# Patient Record
Sex: MALE | Race: WHITE | URBAN - METROPOLITAN AREA
[De-identification: names, ages, dates, MRNs, and addresses within clinical notes are randomized per-mention and may not be internally consistent; named-entity substitution may affect disease eponyms.]

---

## 2017-07-03 ENCOUNTER — TELEPHONE (OUTPATIENT)
Dept: FAMILY MEDICINE | Facility: CLINIC | Age: 57
End: 2017-07-03

## 2017-07-03 DIAGNOSIS — K64.4 EXTERNAL HEMORRHOIDS: Primary | ICD-10-CM

## 2017-07-03 RX ORDER — TRIAMCINOLONE ACETONIDE 1 MG/G
CREAM TOPICAL 3 TIMES DAILY PRN
Qty: 80 G | Refills: 0 | Status: SHIPPED | OUTPATIENT
Start: 2017-07-03

## 2017-07-03 NOTE — TELEPHONE ENCOUNTER
Pt needs to speak to Dr. Kaminski regarding a personal health matter.        Thank you,   [place] Scheduling  [phone]

## 2017-07-03 NOTE — TELEPHONE ENCOUNTER
Called patient. Recurrence of hemorrhoids. Actually settled down a lot today but it sounds like this is a recurrent issue every week or 2. Referral placed to [facility]

## 2017-07-03 NOTE — TELEPHONE ENCOUNTER
This writer attempted to contact Param on 07/03/17      Reason for call returned call and left message to return call.      When patient calls back, please contact clinic RN team. If no one available, document that pt called and route to care team.         Marisol Pride RN

## 2017-07-03 NOTE — TELEPHONE ENCOUNTER
Patient is calling back stating that he only wants to talk to Dr Kaminski about a personal issue that pt has been seen for.

## 2017-10-02 ENCOUNTER — OFFICE VISIT (OUTPATIENT)
Dept: FAMILY MEDICINE | Facility: CLINIC | Age: 57
End: 2017-10-02
Payer: COMMERCIAL

## 2017-10-02 VITALS
HEIGHT: 69 IN | HEART RATE: 85 BPM | BODY MASS INDEX: 29.15 KG/M2 | WEIGHT: 196.8 LBS | SYSTOLIC BLOOD PRESSURE: 130 MMHG | DIASTOLIC BLOOD PRESSURE: 88 MMHG | TEMPERATURE: 98 F | OXYGEN SATURATION: 95 %

## 2017-10-02 DIAGNOSIS — H61.22 IMPACTED CERUMEN OF LEFT EAR: Primary | ICD-10-CM

## 2017-10-02 PROCEDURE — 99213 OFFICE O/P EST LOW 20 MIN: CPT | Mod: 25 | Performed by: FAMILY MEDICINE

## 2017-10-02 PROCEDURE — 69210 REMOVE IMPACTED EAR WAX UNI: CPT | Performed by: FAMILY MEDICINE

## 2017-10-02 ASSESSMENT — PAIN SCALES - GENERAL: PAINLEVEL: MILD PAIN (3)

## 2017-10-02 NOTE — NURSING NOTE
"Chief Complaint   Patient presents with     Ear Problem     left ear       Initial BP (!) 132/92 (BP Location: Right arm, Patient Position: Chair, Cuff Size: Adult Regular)  Pulse 85  Temp 98  F (36.7  C) (Oral)  Ht 1.741 m (5' 8.54\")  Wt 89.3 kg (196 lb 12.8 oz)  SpO2 95%  BMI 29.45 kg/m2 Estimated body mass index is 29.45 kg/(m^2) as calculated from the following:    Height as of this encounter: 1.741 m (5' 8.54\").    Weight as of this encounter: 89.3 kg (196 lb 12.8 oz).  Medication Reconciliation: complete     SUDEEP Leija MA      "

## 2017-10-02 NOTE — PROGRESS NOTES
"  SUBJECTIVE:   Param Reilly is a 56 year old male who presents to clinic today for the following health issues:      Acute Illness   Acute illness concerns: LEFT EAR  Onset: 8-10 days    Fever: no    Chills/Sweats: YES    Headache (location?): no    Sinus Pressure:YES left maxillary     Conjunctivitis:  no    Ear Pain: YES: left    Rhinorrhea: YES    Congestion: YES    Sore Throat: no     Cough: no    Wheeze: no    Decreased Appetite: no    Nausea: no    Vomiting: no    Diarrhea:  no    Dysuria/Freq.: no    Fatigue/Achiness: no    Sick/Strep Exposure: no     Therapies Tried and outcome: none  Night sweats.            Problem list and histories reviewed & adjusted, as indicated.  Additional history: as documented    BP Readings from Last 3 Encounters:   10/02/17 130/88   10/18/16 126/82   08/24/16 134/88    Wt Readings from Last 3 Encounters:   10/02/17 89.3 kg (196 lb 12.8 oz)   10/18/16 89.8 kg (198 lb)   08/24/16 90.5 kg (199 lb 9.6 oz)                      Reviewed and updated as needed this visit by clinical staffTobacco  Allergies  Med Hx  Surg Hx  Fam Hx  Soc Hx      Reviewed and updated as needed this visit by Provider  Tobacco  Allergies  Meds  Med Hx  Surg Hx  Fam Hx  Soc Hx        ROS:  Constitutional, HEENT, cardiovascular, pulmonary, gi and gu systems are negative, except as otherwise noted.      OBJECTIVE:   /88  Pulse 85  Temp 98  F (36.7  C) (Oral)  Ht 1.741 m (5' 8.54\")  Wt 89.3 kg (196 lb 12.8 oz)  SpO2 95%  BMI 29.45 kg/m2  Body mass index is 29.45 kg/(m^2).  GENERAL: healthy, alert and no distress  HENT: normal cephalic/atraumatic, right ear: normal: no effusions, no erythema, normal landmarks, left ear: Cerumenosis is noted.  Wax is removed by syringing and manual debridement.  Large amount removed.  Some residual remains, debrox recommended.  Instructions for home care to prevent wax buildup are given., nose and mouth without ulcers or lesions, oropharynx clear " and oral mucous membranes moist  NECK: no adenopathy, no asymmetry, masses, or scars and thyroid normal to palpation  RESP: lungs clear to auscultation - no rales, rhonchi or wheezes  CV: regular rate and rhythm, normal S1 S2, no S3 or S4, no murmur, click or rub, no peripheral edema and peripheral pulses strong  MS: no gross musculoskeletal defects noted, no edema          ASSESSMENT/PLAN:           1. Impacted cerumen of left ear  No sign infection on exam.  Residual cerumen present.    - REMOVE IMPACTED CERUMEN  - carbamide peroxide (DEBROX) 6.5 % otic solution; Place 5-10 drops Into the left ear 2 times daily  Dispense: 30 mL; Refill: 0    Patient Instructions   Debrox drops twice a day as directed.      Once current wax is out, you can prevent future plugging with the following:    For the ear wax - use cotton ball dipped in mineral oil and place in the external canal for 10 to 20 minutes once per week        Sara Steve MD  [facility]

## 2017-10-02 NOTE — PATIENT INSTRUCTIONS
Debrox drops twice a day as directed.      Once current wax is out, you can prevent future plugging with the following:    For the ear wax - use cotton ball dipped in mineral oil and place in the external canal for 10 to 20 minutes once per week

## 2017-10-02 NOTE — MR AVS SNAPSHOT
After Visit Summary   10/2/2017    Param Reilly    MRN: [id number]           Patient Information     Date Of Birth          1960        Visit Information        Provider Department      10/2/2017 5:20 PM Sara Steve MD [facility]        Today's Diagnoses     Impacted cerumen of left ear    -  1      Care Instructions    Debrox drops twice a day as directed.      Once current wax is out, you can prevent future plugging with the following:    For the ear wax - use cotton ball dipped in mineral oil and place in the external canal for 10 to 20 minutes once per week            Follow-ups after your visit        Who to contact     If you have questions or need follow up information about today's clinic visit or your schedule please contact [facility] directly at [phone].  Normal or non-critical lab and imaging results will be communicated to you by MyChart, letter or phone within 4 business days after the clinic has received the results. If you do not hear from us within 7 days, please contact the clinic through MyChart or phone. If you have a critical or abnormal lab result, we will notify you by phone as soon as possible.  Submit refill requests through [employer] or call your pharmacy and they will forward the refill request to us. Please allow 3 business days for your refill to be completed.          Additional Information About Your Visit        MyChart Information     [employer] gives you secure access to your electronic health record. If you see a primary care provider, you can also send messages to your care team and make appointments. If you have questions, please call your primary care clinic.  If you do not have a primary care provider, please call [phone] and they will assist you.        Care EveryWhere ID     This is your Care EveryWhere ID. This could be used by other organizations to access your [facility] medical records  [id number]       "  Your Vitals Were     Pulse Temperature Height Pulse Oximetry BMI (Body Mass Index)       85 98  F (36.7  C) (Oral) 1.741 m (5' 8.54\") 95% 29.45 kg/m2        Blood Pressure from Last 3 Encounters:   10/02/17 130/88   10/18/16 126/82   08/24/16 134/88    Weight from Last 3 Encounters:   10/02/17 89.3 kg (196 lb 12.8 oz)   10/18/16 89.8 kg (198 lb)   08/24/16 90.5 kg (199 lb 9.6 oz)              We Performed the Following     REMOVE IMPACTED CERUMEN          Today's Medication Changes          These changes are accurate as of: 10/2/17  6:44 PM.  If you have any questions, ask your nurse or doctor.               Start taking these medicines.        Dose/Directions    carbamide peroxide 6.5 % otic solution   Commonly known as:  DEBROX   Used for:  Impacted cerumen of left ear   Started by:  Sara Steve MD        Dose:  5-10 drop   Place 5-10 drops Into the left ear 2 times daily   Quantity:  30 mL   Refills:  0            Where to get your medicines      These medications were sent to [facility] [id number] [place] [address], [address], [facility] [address]     Phone:  [phone]     carbamide peroxide 6.5 % otic solution                Primary Care Provider Office Phone # Fax #    Noa Nayan Kaminski -[phone] [address] [facility] [address]        Equal Access to Services     ABIGAIL MOLINA AH: Hadii tatyana lara hadasho Soomaali, waaxda luqadaha, qaybta kaalmada adeegyada, waxay iditana delarosa. So [facility] [phone].    ATENCIÓN: Si habla español, tiene a chandler disposición servicios gratuitos de asistencia lingüística. Llame al [phone].    We comply with applicable federal civil rights laws and [place] laws. We do not discriminate on the basis of race, color, national origin, age, disability, sex, sexual orientation, or gender identity.            Thank you!     Thank you for choosing [facility]  for your care. Our goal is always to provide you with excellent care. Hearing back from our patients is one way we can continue to improve our services. Please take a few minutes to complete the written survey that you may receive in the mail after your visit with us. Thank you!             Your Updated Medication List - Protect others around you: Learn how to safely use, store and throw away your medicines at www.disposemymeds.org.          This list is accurate as of: 10/2/17  6:44 PM.  Always use your most recent med list.                   Brand Name Dispense Instructions for use Diagnosis    carbamide peroxide 6.5 % otic solution    DEBROX    30 mL    Place 5-10 drops Into the left ear 2 times daily    Impacted cerumen of left ear       triamcinolone 0.1 % cream    KENALOG    80 g    Apply topically 3 times daily as needed for irritation    External hemorrhoids

## 2018-09-24 ENCOUNTER — OFFICE VISIT (OUTPATIENT)
Dept: ORTHOPEDICS | Facility: CLINIC | Age: 58
End: 2018-09-24
Payer: COMMERCIAL

## 2018-09-24 ENCOUNTER — RADIANT APPOINTMENT (OUTPATIENT)
Dept: GENERAL RADIOLOGY | Facility: CLINIC | Age: 58
End: 2018-09-24
Attending: FAMILY MEDICINE
Payer: COMMERCIAL

## 2018-09-24 VITALS
BODY MASS INDEX: 29.18 KG/M2 | SYSTOLIC BLOOD PRESSURE: 149 MMHG | OXYGEN SATURATION: 95 % | HEIGHT: 69 IN | DIASTOLIC BLOOD PRESSURE: 89 MMHG | HEART RATE: 90 BPM | WEIGHT: 197 LBS

## 2018-09-24 DIAGNOSIS — M25.561 ACUTE PAIN OF RIGHT KNEE: Primary | ICD-10-CM

## 2018-09-24 DIAGNOSIS — M25.561 RIGHT KNEE PAIN: ICD-10-CM

## 2018-09-24 PROCEDURE — 20610 DRAIN/INJ JOINT/BURSA W/O US: CPT | Mod: RT | Performed by: FAMILY MEDICINE

## 2018-09-24 PROCEDURE — 99207 ZZC NO CHARGE LOS: CPT | Performed by: FAMILY MEDICINE

## 2018-09-24 PROCEDURE — 73562 X-RAY EXAM OF KNEE 3: CPT | Mod: RT | Performed by: RADIOLOGY

## 2018-09-24 RX ORDER — TRIAMCINOLONE ACETONIDE 40 MG/ML
40 INJECTION, SUSPENSION INTRA-ARTICULAR; INTRAMUSCULAR ONCE
Qty: 1 ML | Refills: 0 | OUTPATIENT
Start: 2018-09-24 | End: 2018-09-24

## 2018-09-24 ASSESSMENT — PAIN SCALES - GENERAL: PAINLEVEL: MODERATE PAIN (5)

## 2018-09-24 NOTE — NURSING NOTE
"Param Reilly's chief complaint for this visit includes:  Chief Complaint   Patient presents with     Consult     right knee pain [id number]     PCP: Noa Kaminski    Referring Provider:  No referring provider defined for this encounter.    /89  Pulse 90  Ht 1.745 m (5' 8.7\")  Wt 89.4 kg (197 lb)  SpO2 95%  BMI 29.35 kg/m2  Moderate Pain (5)     Do you need any medication refills at today's visit? No        "

## 2018-09-24 NOTE — PATIENT INSTRUCTIONS
Thanks for coming today.  Ortho/Sports Medicine Clinic  [address] [place] [address]    To schedule future appointments in Ortho Clinic, you may call [phone].    To schedule ordered imaging by your provider:   Call Central Imaging [phone]    To schedule an injection ordered by your provider:  Call Central Imaging Injection scheduling line: [phone]  [employer]hart available online at:  [email or web address]    Please call if any further questions or concerns ([phone]).  Clinic hours 8 am to 5 pm.    Return to clinic (call) if symptoms worsen or fail to improve.

## 2018-09-24 NOTE — PROGRESS NOTES
CHIEF COMPLAINT:  No chief complaint on file.       HISTORY OF PRESENT ILLNESS  Mr. Reilly is a pleasant 57 year old year old male who presents to clinic today with right knee pain.  Kevin is seen at the request of Dr. Kaminski.    Kevin first noticed pain and swelling in his right knee about 3-1/2-4 weeks ago.  No event that he can recall that caused his pain.  His knee has been swelling over this time as well.  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  He points mostly to the lateral and medial compartments.  His knee has not given out on him, he has not noticed any redness but he has noticed some heat.    Additional history: as documented    MEDICAL HISTORY  Patient Active Problem List   Diagnosis     Other acquired deformity of other parts of limb     CARDIOVASCULAR SCREENING; LDL GOAL LESS THAN 130     Tobacco abuse     Non morbid obesity due to excess calories       Current Outpatient Prescriptions   Medication Sig Dispense Refill     carbamide peroxide (DEBROX) 6.5 % otic solution Place 5-10 drops Into the left ear 2 times daily 30 mL 0     triamcinolone (KENALOG) 0.1 % cream Apply topically 3 times daily as needed for irritation 80 g 0       Allergies   Allergen Reactions     Bactrim [Sulfamethoxazole W/Trimethoprim] Itching and Rash       Family History   Problem Relation Age of Onset     Cancer Father        Additional medical/Social/Surgical histories reviewed in [facility] and updated as appropriate.     REVIEW OF SYSTEMS (9/24/2018)  CONSTITUTIONAL: Denies fever and weight loss  EYES: Denies acute vision changes  ENT: Denies hearing changes or difficulty swallowing  CARDIAC: Denies chest pain or edema  RESPIRATORY: Denies dyspnea, cough or wheeze  GASTROINTESTINAL: Denies abdominal pain, nausea, vomiting  MUSCULOSKELETAL: See HPI  SKIN: Denies any recent rash or lesion  NEUROLOGICAL: Denies numbness or focal weakness  PSYCHIATRIC: No history of psychiatric symptoms or  "problems  ENDOCRINE: Denies current diagnosis of diabetes  HEMATOLOGY: Denies episodes of easy bleeding      PHYSICAL EXAM  Vitals:    09/24/18 0714   BP: 149/89   Pulse: 90   SpO2: 95%   Weight: 89.4 kg (197 lb)   Height: 1.745 m (5' 8.7\")     General  - normal appearance, in no obvious distress  CV  - normal popliteal pulse  Pulm  - normal respiratory pattern, non-labored  Musculoskeletal - right knee  - stance: mildly antalgic gait  - inspection: generalized swelling, 1+effusion  - palpation: medial and lateral joint line tenderness, patellofemoral tenderness medially and laterally  - ROM: 100 degrees flexion, 0 degrees extension, painful active ROM  - strength: 5/5 in flexion, 5/5 in extension  Neuro  - no sensory or motor deficit, grossly normal coordination, normal muscle tone  Skin  - no ecchymosis, erythema, warmth, or induration, no obvious rash  Psych  - interactive, appropriate, normal mood and affect             ASSESSMENT & PLAN  Mr. Reilly is a 57 year old year old male who presents to clinic today with right knee pain.    I ordered and reviewed an x-ray of his knee which does show lateral and medial arthritic change.    Pain is likely to be secondary to osteoarthritis.  Kevin's we discussed pathogenesis and comments treatment strategies in detail.    I did inject his knee with corticosteroid today (see procedure note).  He tolerated this well.    If he continues to have pain over the next 2-3 weeks we should follow-up, if this is the case I would likely order an MRI.    Thank you for allowing me to participate in Kevin's care.    PROCEDURE    Knee Injection - Intraarticular  The patient was informed of the risks and the benefits of the procedure and a written consent was signed.  The patient s left knee was prepped with chlorhexidine in sterile fashion.   40 mg of triamcinolone suspension was drawn up into a 5 mL syringe with 2 mL of 1% lidocaine and 2 mL of 0.5% marcaine.  Injection was performed " using substerile technique.  A 1.5-inch 25-gauge needle was used to enter the lateral aspect of the left knee.  Injection performed successfully without difficulty.  There were no complications. The patient tolerated the procedure well. There was negligible bleeding.   The patient was instructed to ice the knee upon leaving clinic and refrain from overuse over the next 3 days.   The patient was instructed to call or go to the emergency room with any unusual pain, swelling, redness, or if otherwise concerned.  A follow up appointment will be scheduled to evaluate response to the injection, and to assess range of motion and pain.  Kenalog: NDC [id number]        Ej Souza DO, [facility]  Primary Care Sports Medicine

## 2018-09-24 NOTE — MR AVS SNAPSHOT
After Visit Summary   2018    Param Reilly    MRN: [id number]           Patient Information     Date Of Birth          1960        Visit Information        Provider Department      2018 7:00 AM Ej Souza, DO [facility]        Today's Diagnoses     Right knee pain    -  1      Care Instructions    Thanks for coming today.  Ortho/Sports Medicine Clinic  [address] [place] [address]    To schedule future appointments in Ortho Clinic, you may call [phone].    To schedule ordered imaging by your provider:   Call Central Imaging [phone]    To schedule an injection ordered by your provider:  Call Central Imaging Injection scheduling line: [phone]  Ready available online at:  [employer]/Sara Campbell    Please call if any further questions or concerns ([phone]).  Clinic hours 8 am to 5 pm.    Return to clinic (call) if symptoms worsen or fail to improve.            Follow-ups after your visit        Who to contact     If you have questions or need follow up information about today's clinic visit or your schedule please contact [facility] directly at [phone].  Normal or non-critical lab and imaging results will be communicated to you by [employer], letter or phone within 4 business days after the clinic has received the results. If you do not hear from us within 7 days, please contact the clinic through [employer] or phone. If you have a critical or abnormal lab result, we will notify you by phone as soon as possible.  Submit refill requests through [employer] or call your pharmacy and they will forward the refill request to us. Please allow 3 business days for your refill to be completed.          Additional Information About Your Visit        MyChart Information     Ready gives you secure access to your electronic health record. If you see a primary care provider, you can also send messages to your  "care team and make appointments. If you have questions, please call your primary care clinic.  If you do not have a primary care provider, please call [phone] and they will assist you.      [employer] is an electronic gateway that provides easy, online access to your medical records. With [employer], you can request a clinic appointment, read your test results, renew a prescription or communicate with your care team.     To access your existing account, please contact your [facility] or call [phone] for assistance.        Care EveryWhere ID     This is your Care EveryWhere ID. This could be used by other organizations to access your [place] medical records  [id number]        Your Vitals Were     Pulse Height Pulse Oximetry BMI (Body Mass Index)          90 1.745 m (5' 8.7\") 95% 29.35 kg/m2         Blood Pressure from Last 3 Encounters:   09/24/18 149/89   10/02/17 130/88   10/18/16 126/82    Weight from Last 3 Encounters:   09/24/18 89.4 kg (197 lb)   10/02/17 89.3 kg (196 lb 12.8 oz)   10/18/16 89.8 kg (198 lb)               Primary Care Provider Office Phone # Fax #    Noa Nayan Kaminski [phone] [address] [facility] [address]        Equal Access to Services     MALLORY MOLINA : Hadii aad ku hadasho Soomaali, waaxda luqadaha, qaybta kaalmada adeegyada, max delarosa. So [facility] [phone].    ATENCIÓN: Si habla español, tiene a chandler disposición servicios gratuitos de asistencia lingüística. Llame al [phone].    We comply with applicable federal civil rights laws and [place] laws. We do not discriminate on the basis of race, color, national origin, age, disability, sex, sexual orientation, or gender identity.            Thank you!     Thank you for choosing [facility]  for your care. Our goal is always to provide you with excellent care. Hearing back from our patients is one way we can " continue to improve our services. Please take a few minutes to complete the written survey that you may receive in the mail after your visit with us. Thank you!             Your Updated Medication List - Protect others around you: Learn how to safely use, store and throw away your medicines at www.disposemymeds.org.          This list is accurate as of 9/24/18  7:47 AM.  Always use your most recent med list.                   Brand Name Dispense Instructions for use Diagnosis    carbamide peroxide 6.5 % otic solution    DEBROX    30 mL    Place 5-10 drops Into the left ear 2 times daily    Impacted cerumen of left ear       triamcinolone 0.1 % cream    KENALOG    80 g    Apply topically 3 times daily as needed for irritation    External hemorrhoids

## 2018-09-24 NOTE — LETTER
September 24, 2018      Param Reilly  [address] [place] [address]              Dear  or Kevin King was seen in our office today for right knee pain.  Please excuse him from work for today's appointment.    Please call with questions or concerns.      Sincerely,              Ej Souza, DO [facility]

## 2018-09-24 NOTE — LETTER
9/24/2018         RE: Param Reilly  [address] [place] [address]        Dear Colleague,    Thank you for referring your patient, Param Reilly, to the [facility]. Please see a copy of my visit note below.    CHIEF COMPLAINT:  No chief complaint on file.       HISTORY OF PRESENT ILLNESS  Mr. Reilly is a pleasant 57 year old year old male who presents to clinic today with right knee pain.  Kevin is seen at the request of Dr. Kaminski.    Kevin first noticed pain and swelling in his right knee about 3-1/2-4 weeks ago.  No event that he can recall that caused his pain.  His knee has been swelling over this time as well.  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  He points mostly to the lateral and medial compartments.  His knee has not given out on him, he has not noticed any redness but he has noticed some heat.    Additional history: as documented    MEDICAL HISTORY  Patient Active Problem List   Diagnosis     Other acquired deformity of other parts of limb     CARDIOVASCULAR SCREENING; LDL GOAL LESS THAN 130     Tobacco abuse     Non morbid obesity due to excess calories       Current Outpatient Prescriptions   Medication Sig Dispense Refill     carbamide peroxide (DEBROX) 6.5 % otic solution Place 5-10 drops Into the left ear 2 times daily 30 mL 0     triamcinolone (KENALOG) 0.1 % cream Apply topically 3 times daily as needed for irritation 80 g 0       Allergies   Allergen Reactions     Bactrim [Sulfamethoxazole W/Trimethoprim] Itching and Rash       Family History   Problem Relation Age of Onset     Cancer Father        Additional medical/Social/Surgical histories reviewed in [facility] and updated as appropriate.     REVIEW OF SYSTEMS (9/24/2018)  CONSTITUTIONAL: Denies fever and weight loss  EYES: Denies acute vision changes  ENT: Denies hearing changes or difficulty swallowing  CARDIAC: Denies chest pain or edema  RESPIRATORY:  "Denies dyspnea, cough or wheeze  GASTROINTESTINAL: Denies abdominal pain, nausea, vomiting  MUSCULOSKELETAL: See HPI  SKIN: Denies any recent rash or lesion  NEUROLOGICAL: Denies numbness or focal weakness  PSYCHIATRIC: No history of psychiatric symptoms or problems  ENDOCRINE: Denies current diagnosis of diabetes  HEMATOLOGY: Denies episodes of easy bleeding      PHYSICAL EXAM  Vitals:    09/24/18 0714   BP: 149/89   Pulse: 90   SpO2: 95%   Weight: 89.4 kg (197 lb)   Height: 1.745 m (5' 8.7\")     General  - normal appearance, in no obvious distress  CV  - normal popliteal pulse  Pulm  - normal respiratory pattern, non-labored  Musculoskeletal - right knee  - stance: mildly antalgic gait  - inspection: generalized swelling, 1+effusion  - palpation: medial and lateral joint line tenderness, patellofemoral tenderness medially and laterally  - ROM: 100 degrees flexion, 0 degrees extension, painful active ROM  - strength: 5/5 in flexion, 5/5 in extension  Neuro  - no sensory or motor deficit, grossly normal coordination, normal muscle tone  Skin  - no ecchymosis, erythema, warmth, or induration, no obvious rash  Psych  - interactive, appropriate, normal mood and affect             ASSESSMENT & PLAN  Mr. Reilly is a 57 year old year old male who presents to clinic today with right knee pain.    I ordered and reviewed an x-ray of his knee which does show lateral and medial arthritic change.    Pain is likely to be secondary to osteoarthritis.  Kevin's we discussed pathogenesis and comments treatment strategies in detail.    I did inject his knee with corticosteroid today (see procedure note).  He tolerated this well.    If he continues to have pain over the next 2-3 weeks we should follow-up, if this is the case I would likely order an MRI.    Thank you for allowing me to participate in Kevin's care.    PROCEDURE    Knee Injection - Intraarticular  The patient was informed of the risks and the benefits of the procedure " and a written consent was signed.  The patient s left knee was prepped with chlorhexidine in sterile fashion.   40 mg of triamcinolone suspension was drawn up into a 5 mL syringe with 2 mL of 1% lidocaine and 2 mL of 0.5% marcaine.  Injection was performed using substerile technique.  A 1.5-inch 25-gauge needle was used to enter the lateral aspect of the left knee.  Injection performed successfully without difficulty.  There were no complications. The patient tolerated the procedure well. There was negligible bleeding.   The patient was instructed to ice the knee upon leaving clinic and refrain from overuse over the next 3 days.   The patient was instructed to call or go to the emergency room with any unusual pain, swelling, redness, or if otherwise concerned.  A follow up appointment will be scheduled to evaluate response to the injection, and to assess range of motion and pain.  Kenalog: NDC 7976-3882-34        Ej Souza DO, [facility]  Primary Care Sports Medicine           Again, thank you for allowing me to participate in the care of your patient.        Sincerely,        Ej Souza DO

## 2018-12-12 ENCOUNTER — OFFICE VISIT (OUTPATIENT)
Dept: ORTHOPEDICS | Facility: CLINIC | Age: 58
End: 2018-12-12
Payer: COMMERCIAL

## 2018-12-12 ENCOUNTER — TELEPHONE (OUTPATIENT)
Dept: ORTHOPEDICS | Facility: CLINIC | Age: 58
End: 2018-12-12

## 2018-12-12 VITALS
DIASTOLIC BLOOD PRESSURE: 92 MMHG | WEIGHT: 192.6 LBS | HEART RATE: 72 BPM | SYSTOLIC BLOOD PRESSURE: 130 MMHG | OXYGEN SATURATION: 97 % | BODY MASS INDEX: 28.69 KG/M2

## 2018-12-12 DIAGNOSIS — M25.461 EFFUSION, RIGHT KNEE: ICD-10-CM

## 2018-12-12 DIAGNOSIS — M17.11 PRIMARY OSTEOARTHRITIS OF RIGHT KNEE: Primary | ICD-10-CM

## 2018-12-12 PROCEDURE — 99207 ZZC DROP WITH A PROCEDURE: CPT | Mod: 25 | Performed by: FAMILY MEDICINE

## 2018-12-12 PROCEDURE — 20611 DRAIN/INJ JOINT/BURSA W/US: CPT | Mod: RT | Performed by: FAMILY MEDICINE

## 2018-12-12 RX ORDER — IBUPROFEN 200 MG
600 TABLET ORAL EVERY 4 HOURS PRN
COMMUNITY

## 2018-12-12 ASSESSMENT — PAIN SCALES - GENERAL: PAINLEVEL: SEVERE PAIN (6)

## 2018-12-12 NOTE — NURSING NOTE
Param Reilly's chief complaint for this visit includes:  Chief Complaint   Patient presents with     Knee right     pain to right knee Cortisone injection helped for 1 to 2 weeks     PCP: Noa Kaminski    Referring Provider:  Ej Souza DO  [address] [place] [address]    There were no vitals taken for this visit.  Severe Pain (6)     Do you need any medication refills at today's visit? Lula Miller LPN

## 2018-12-12 NOTE — PATIENT INSTRUCTIONS
Thanks for coming today.  Ortho/Sports Medicine Clinic  [address] [place] [address]    To schedule future appointments in Ortho Clinic, you may call [phone].    To schedule ordered imaging by your provider:   Call Central Imaging [phone]    To schedule an injection ordered by your provider:  Call Central Imaging Injection scheduling line: [phone]  Sophiris Bio available online at:  [email or web address]    Please call if any further questions or concerns ([phone]).  Clinic hours 8 am to 5 pm.    Return to clinic (call) if symptoms worsen or fail to improve.  Patient Education     Hylan Solution for injection  What is this medicine?  HYLAN G-F 20 (HI layton G F 20) is used to treat osteoarthritis of the knee. It lubricates and cushions the joint, reducing pain in the knee.  This medicine may be used for other purposes; ask your health care provider or pharmacist if you have questions.  What should I tell my health care provider before I take this medicine?  They need to know if you have any of these conditions:    severe knee inflammation    skin conditions or sensitivity    skin or joint infection    venous stasis    an unusual or allergic reaction to hylan G-F 20, hyaluronan (sodium hyaluronate), eggs, other medicines, foods, dyes, or preservatives    pregnant or trying to get pregnant    breast-feeding  How should I use this medicine?  This medicine is for injection into the knee joint. It is given by a health care professional in a hospital or clinic setting.  Talk to your pediatrician regarding the use of this medicine in children. This medicine is not approved for use in children.  Overdosage: If you think you've taken too much of this medicine contact a poison control center or emergency room at once.  NOTE: This medicine is only for you. Do not share this medicine with others.  What if I miss a dose?  Keep appointments for follow-up doses as directed. For Synvisc,  you will need weekly injections for 3 doses. It is important not to miss your dose. If you will receive Synvisc-One, then only 1 injection will be needed. Call your doctor or health care professional if you are unable to keep an appointment.  What may interact with this medicine?  Do not take this medicine with any of the following medications:    other injections for the joint like steroids or anesthetics    certain skin disinfectants like benzalkonium chloride  This list may not describe all possible interactions. Give your health care provider a list of all the medicines, herbs, non-prescription drugs, or dietary supplements you use. Also tell them if you smoke, drink alcohol, or use illegal drugs. Some items may interact with your medicine.  What should I watch for while using this medicine?  Tell your doctor or healthcare professional if your symptoms do not start to get better or if they get worse. Your condition will be monitored carefully while you are receiving this medicine. Most persons get pain relief for up to 6 months after treatment.  Avoid strenuous activities (high-impact sports, jogging) or major weight-bearing activities for 48 hours after the injection.  What side effects may I notice from receiving this medicine?  Side effects that you should report to your doctor or health care professional as soon as possible:    allergic reactions like skin rash, itching or hives, swelling of the face, lips, or tongue    difficulty breathing    fever or chills    severe joint pain or swelling    unusual bleeding or bruising  Side effects that usually do not require medical attention (Report these to your doctor or health care professional if they continue or are bothersome.):    dizziness    flushing    general ill feeling or flu-like symptoms    headache    minor joint pain or swelling    muscle pain or cramps    pain, redness, irritation or bruising at site of injection  This list may not describe all  possible side effects. Call your doctor for medical advice about side effects. You may report side effects to FDA at 9-749-NNN-7646.  Where should I keep my medicine?  This drug is given in a hospital or clinic and will not be stored at home.  NOTE: This sheet is a summary. It may not cover all possible information. If you have questions about this medicine, talk to your doctor, pharmacist, or health care provider.  NOTE:This sheet is a summary. It may not cover all possible information. If you have questions about this medicine, talk to your doctor, pharmacist, or health care provider. Copyright  2016 Gold Standard           Patient Education

## 2018-12-12 NOTE — LETTER
12/12/2018         RE: Param Reilly  [address] [place] [address]        Dear Colleague,    Thank you for referring your patient, Param Reilly, to the [facility]. Please see a copy of my visit note below.    HISTORY OF PRESENT ILLNESS  Mr. Reilly is a pleasant 58 year old year old male following up with right knee osteoarthritis.  I last saw Kevin in September.  Visit I injected his knee with corticosteroid.  This helped his symptoms for a couple of weeks, unfortunately his knee swelling and pain returned.  He is interested in a repeat injection today.  Additional history: as documented      REVIEW OF SYSTEMS (12/12/2018)  10 point ROS of systems including Constitutional, Eyes, Respiratory, Cardiovascular, Gastroenterology, Genitourinary, Integumentary, Musculoskeletal, Psychiatric were all negative except for pertinent positives noted in my HPI.     PHYSICAL EXAM  Vitals:    12/12/18 1422   BP: (!) 130/92   BP Location: Left arm   Patient Position: Sitting   Cuff Size: Adult Regular   Pulse: 72   SpO2: 97%   Weight: 87.4 kg (192 lb 9.6 oz)     General  - normal appearance, in no obvious distress  CV  - normal popliteal pulse  Pulm  - normal respiratory pattern, non-labored  Musculoskeletal - right knee  - stance: normal gait  - inspection: generalized swelling, 2+ effusion  - palpation: medial joint line tenderness, patella and patellar tendon non-tender, normal popliteal pulse  - ROM: 100 degrees flexion, 0 degrees extension, painful active ROM    Neuro  - no sensory or motor deficit, grossly normal coordination, normal muscle tone  Skin  - no ecchymosis, erythema, warmth, or induration, no obvious rash  Psych  - interactive, appropriate, normal mood and affect            ASSESSMENT & PLAN  Mr. Reilly is a 58 year old year old male following up with right knee osteoarthritis.    We revisited his OA in detail.  Kevin's ROM difficulties are likely due to his  large effusion.    I aspirated his knee (see procedure note) and injected it with corticosteroid.    Kevin is going to follow up if recurrent.  In the future we could consider HA.    It was a pleasure seeing Param.    Knee Aspiration and Injection - Ultrasound Guided  The patient was informed of the risks and the benefits of the procedure and a written consent was signed.  The patient s right knee was prepped with chlorhexidine in sterile fashion.   40 mg of triamcinolone suspension was drawn up into a 5 mL syringe with 2 mL of 1% lidocaine and 2 mL of 0.5% marcaine.  Procedure was performed using sterile technique.  Local anesthesia was performed using a 27-gauge 1.5-inch needle to administer 3 mL of 1% lidocaine without epi.  Under ultrasound guidance a 1.5-inch 18-gauge needle on a 30 cc syringe was used to enter the superolateral aspect of the right knee.  Needle placement was visualized and documented with ultrasound.  Ultrasound visualization was necessary due to ensure proper placement of needle in to the effusion, as well as resolution of effusion once aspiration was completed.  60 cc of clear yellow joint fluid was aspirated.  Syringe was then swapped for the 5 mL syringe containing the corticosteroid injectate, holding the needle in place with a sterile hemostat.  Injection was performed successfully, without difficulty.  Images were permanently stored for the patient's record.  There were no complications. The patient tolerated the procedure well. There was negligible bleeding.   The patient was instructed to ice the knee upon leaving clinic and refrain from overuse over the next 3 days.   The patient was instructed to call or go to the emergency room with any unusual pain, swelling, redness, or if otherwise concerned.  Kenalog: NDC 7984-6419-64          Ej Souza DO, [facility]            Again, thank you for allowing me to participate in the care of your patient.        Sincerely,        Ej Souza  DO

## 2018-12-12 NOTE — PROGRESS NOTES
HISTORY OF PRESENT ILLNESS  Mr. Reilly is a pleasant 58 year old year old male following up with right knee osteoarthritis.  I last saw Kevin in September.  Visit I injected his knee with corticosteroid.  This helped his symptoms for a couple of weeks, unfortunately his knee swelling and pain returned.  He is interested in a repeat injection today.  Additional history: as documented      REVIEW OF SYSTEMS (12/12/2018)  10 point ROS of systems including Constitutional, Eyes, Respiratory, Cardiovascular, Gastroenterology, Genitourinary, Integumentary, Musculoskeletal, Psychiatric were all negative except for pertinent positives noted in my HPI.     PHYSICAL EXAM  Vitals:    12/12/18 1422   BP: (!) 130/92   BP Location: Left arm   Patient Position: Sitting   Cuff Size: Adult Regular   Pulse: 72   SpO2: 97%   Weight: 87.4 kg (192 lb 9.6 oz)     General  - normal appearance, in no obvious distress  CV  - normal popliteal pulse  Pulm  - normal respiratory pattern, non-labored  Musculoskeletal - right knee  - stance: normal gait  - inspection: generalized swelling, 2+ effusion  - palpation: medial joint line tenderness, patella and patellar tendon non-tender, normal popliteal pulse  - ROM: 100 degrees flexion, 0 degrees extension, painful active ROM    Neuro  - no sensory or motor deficit, grossly normal coordination, normal muscle tone  Skin  - no ecchymosis, erythema, warmth, or induration, no obvious rash  Psych  - interactive, appropriate, normal mood and affect            ASSESSMENT & PLAN  Mr. Reilly is a 58 year old year old male following up with right knee osteoarthritis.    We revisited his OA in detail.  Kevin's ROM difficulties are likely due to his large effusion.    I aspirated his knee (see procedure note) and injected it with corticosteroid.    Kevin is going to follow up if recurrent.  In the future we could consider HA.    It was a pleasure seeing Param.    Knee Aspiration and Injection -  Ultrasound Guided  The patient was informed of the risks and the benefits of the procedure and a written consent was signed.  The patient s right knee was prepped with chlorhexidine in sterile fashion.   40 mg of triamcinolone suspension was drawn up into a 5 mL syringe with 2 mL of 1% lidocaine and 2 mL of 0.5% marcaine.  Procedure was performed using sterile technique.  Local anesthesia was performed using a 27-gauge 1.5-inch needle to administer 3 mL of 1% lidocaine without epi.  Under ultrasound guidance a 1.5-inch 18-gauge needle on a 30 cc syringe was used to enter the superolateral aspect of the right knee.  Needle placement was visualized and documented with ultrasound.  Ultrasound visualization was necessary due to ensure proper placement of needle in to the effusion, as well as resolution of effusion once aspiration was completed.  60 cc of clear yellow joint fluid was aspirated.  Syringe was then swapped for the 5 mL syringe containing the corticosteroid injectate, holding the needle in place with a sterile hemostat.  Injection was performed successfully, without difficulty.  Images were permanently stored for the patient's record.  There were no complications. The patient tolerated the procedure well. There was negligible bleeding.   The patient was instructed to ice the knee upon leaving clinic and refrain from overuse over the next 3 days.   The patient was instructed to call or go to the emergency room with any unusual pain, swelling, redness, or if otherwise concerned.  Kenalog: NDC 5927-2619-82          Ej Souza DO, CAQSM

## 2018-12-12 NOTE — TELEPHONE ENCOUNTER
Financial Counselor Review for Hylan (gel) injection:    Procedure to be performed (include CPT code):Yes DRAIN/INJECT LARGE JOINT/BURSA - CPT: 02041    Diagnosis code (include ICD-10 code):Osteoarthritis of right knee [M17.11]    Medication order (include J code):Yes   Synvisc One (Hylan G-F 20) - .014  Euflexxa -   Supartz -   Gel One -     Coverage and patient financial responsibility information:YES    Does patient need to be contacted by Financial Counselor:YES, only if Pt is required to pay anything    Note: Do not use abbreviations and route encounter to [facility] [[email or web address]] [facility] [place] [[id number]]

## 2018-12-13 RX ORDER — TRIAMCINOLONE ACETONIDE 40 MG/ML
40 INJECTION, SUSPENSION INTRA-ARTICULAR; INTRAMUSCULAR ONCE
Qty: 1 ML | Refills: 0 | OUTPATIENT
Start: 2018-12-13 | End: 2018-12-13

## 2018-12-13 NOTE — TELEPHONE ENCOUNTER
Per Analy at [facility] no PA is required for any of the injections listed below as long as they are injected into the knee. The diagnosis also has to be one of the M codes such as M17.11    Synvisc One-7325  Euflexxa -   Supartz -   Gel One -     Patient has a $50 copay per injection  REF#[id number]

## 2018-12-14 ENCOUNTER — TELEPHONE (OUTPATIENT)
Dept: ORTHOPEDICS | Facility: CLINIC | Age: 58
End: 2018-12-14

## 2018-12-14 NOTE — TELEPHONE ENCOUNTER
12/14 called and left voicemail. Instructed patient to call  [phone] to schedule 40 minute procedure with Dr. Souza.     Call center ok to schedule.     Kenisha Salvador   Procedure    Ortho/Sports Med/Ent/Eye   [facility]   [phone]

## 2018-12-14 NOTE — TELEPHONE ENCOUNTER
12/14 called and left voicemail. Instructed patient to call [phone] to schedule 40 minute procedure for injection with Dr. Souza.     Kenisha [facility]   Procedure    Ortho/Sports Med/Ent/Eye   [facility]   [phone]

## 2018-12-27 ENCOUNTER — OFFICE VISIT (OUTPATIENT)
Dept: ORTHOPEDICS | Facility: CLINIC | Age: 58
End: 2018-12-27
Payer: COMMERCIAL

## 2018-12-27 VITALS
OXYGEN SATURATION: 98 % | SYSTOLIC BLOOD PRESSURE: 123 MMHG | BODY MASS INDEX: 28.24 KG/M2 | WEIGHT: 189.6 LBS | HEART RATE: 83 BPM | DIASTOLIC BLOOD PRESSURE: 92 MMHG

## 2018-12-27 DIAGNOSIS — M17.11 LOCALIZED OSTEOARTHRITIS OF RIGHT KNEE: Primary | ICD-10-CM

## 2018-12-27 PROCEDURE — 20611 DRAIN/INJ JOINT/BURSA W/US: CPT | Mod: RT | Performed by: FAMILY MEDICINE

## 2018-12-27 NOTE — PROGRESS NOTES
Kevin is here for a hyaluronic acid injection.  He has a history of right knee osteoarthritis.  He feels somewhat better after his aspiration and corticosteroid injection a couple of weeks ago, although his knee is still painful.    Vitals:    12/27/18 1522   BP: (!) 123/92   BP Location: Right arm   Patient Position: Sitting   Cuff Size: Adult Regular   Pulse: 83   SpO2: 98%   Weight: 86 kg (189 lb 9.6 oz)     Knee Injection with Hyaluronic Acid - Ultrasound Guided  The patient was informed of the risks and the benefits of the procedure and a written consent was signed.  The patient s right knee was prepped with chlorhexidine in sterile fashion.   Gel One solution removed from packaging and inspected for consistency with regards to volume and packaging standard.  Injection was performed using sterile technique.  Under ultrasound guidance a 1.5-inch 25-gauge needle was used to enter the superolateral aspect of the right knee.  Needle placement was visualized and documented with ultrasound.  Ultrasound visualization was necessary due to decreased joint space in the setting of osteoarthritis.  Images were permanently stored for the patient's record.  There were no complications. The patient tolerated the procedure well. There was negligible bleeding.   The patient was instructed to ice the knee upon leaving clinic and refrain from overuse over the next 3 days.   The patient was instructed to call or go to the emergency room with any unusual pain, swelling, redness, or if otherwise concerned.  Gel One NDC:  [address]-48    DO ALEKSEY Jefferson

## 2018-12-27 NOTE — NURSING NOTE
Param Reilly's chief complaint for this visit includes:  Chief Complaint   Patient presents with     Imm/Inj     Right knee injection     PCP: Noa Kaminski    Referring Provider:  Ej Souza DO  [address] [place] [address]    BP (!) 123/92 (BP Location: Right arm, Patient Position: Sitting, Cuff Size: Adult Regular)   Pulse 83   Wt 86 kg (189 lb 9.6 oz)   SpO2 98%   BMI 28.24 kg/m    Data Unavailable     Do you need any medication refills at today's visit? No

## 2018-12-27 NOTE — PATIENT INSTRUCTIONS
Thanks for coming today.  Ortho/Sports Medicine Clinic  [address] [place] [address]    To schedule future appointments in Ortho Clinic, you may call [phone].    To schedule ordered imaging by your provider:   Call Central Imaging [phone]    To schedule an injection ordered by your provider:  Call Central Imaging Injection scheduling line: [phone]  Echobithart available online at:  [email or web address]    Please call if any further questions or concerns ([phone]).  Clinic hours 8 am to 5 pm.    Return to clinic (call) if symptoms worsen or fail to improve.

## 2018-12-27 NOTE — LETTER
December 27, 2018      Param Reilly  [address] [place] [address]              Dear  or Kevin King was seen in our clinic today for a knee procedure.  Please excuse him from work tomorrow (December 28, 2018) to recuperate.    Please call with questions or concerns.      Sincerely,              Ej Souza DO CAQSM

## 2018-12-27 NOTE — LETTER
12/27/2018         RE: Param Reilly  [address] [place] [address]        Dear Colleague,    Thank you for referring your patient, Param Reilly, to the [facility]. Please see a copy of my visit note below.    Kevin is here for a hyaluronic acid injection.  He has a history of right knee osteoarthritis.  He feels somewhat better after his aspiration and corticosteroid injection a couple of weeks ago, although his knee is still painful.    Vitals:    12/27/18 1522   BP: (!) 123/92   BP Location: Right arm   Patient Position: Sitting   Cuff Size: Adult Regular   Pulse: 83   SpO2: 98%   Weight: 86 kg (189 lb 9.6 oz)     Knee Injection with Hyaluronic Acid - Ultrasound Guided  The patient was informed of the risks and the benefits of the procedure and a written consent was signed.  The patient s right knee was prepped with chlorhexidine in sterile fashion.   Gel One solution removed from packaging and inspected for consistency with regards to volume and packaging standard.  Injection was performed using sterile technique.  Under ultrasound guidance a 1.5-inch 25-gauge needle was used to enter the superolateral aspect of the right knee.  Needle placement was visualized and documented with ultrasound.  Ultrasound visualization was necessary due to decreased joint space in the setting of osteoarthritis.  Images were permanently stored for the patient's record.  There were no complications. The patient tolerated the procedure well. There was negligible bleeding.   The patient was instructed to ice the knee upon leaving clinic and refrain from overuse over the next 3 days.   The patient was instructed to call or go to the emergency room with any unusual pain, swelling, redness, or if otherwise concerned.  Gel One NDC:  [address]-60    Brian Souza, DO CAQSM        Again, thank you for allowing me to participate in the care of your patient.        Sincerely,        Ej Souza  DO

## 2019-01-16 ENCOUNTER — TELEPHONE (OUTPATIENT)
Dept: ORTHOPEDICS | Facility: CLINIC | Age: 59
End: 2019-01-16

## 2019-01-16 NOTE — TELEPHONE ENCOUNTER
[facility] Call Center    Phone Message    May a detailed message be left on voicemail: yes    Reason for Call: Symptoms or Concerns     If patient has red-flag symptoms, warm transfer to triage line    Current symptom or concern: swollen knee    Symptoms have been present for:  1 week ago week(s)    Has patient previously been seen for this? Yes    By conchita rock    Date: 12/27    Are there any new or worsening symptoms? Yes      Action Taken: Message routed to:  Adult Clinics: Sports Medicine p [id number]

## 2019-01-16 NOTE — TELEPHONE ENCOUNTER
Returned call to patient to discuss. Spoke to patient's wife Lorin.   She reports that Tunde knee has been fairly swollen since gel injection 2 weeks ago. She reports that he has less pain though. I would suggest that he come in to be evaluated by Dr. Souza. Helped schedule with Dr. Souza tomorrow afternoon. In the meantime, suggested use of anti-inflammatory and ice.     All questions answered.

## 2019-01-17 ENCOUNTER — OFFICE VISIT (OUTPATIENT)
Dept: ORTHOPEDICS | Facility: CLINIC | Age: 59
End: 2019-01-17
Payer: COMMERCIAL

## 2019-01-17 VITALS — SYSTOLIC BLOOD PRESSURE: 130 MMHG | OXYGEN SATURATION: 98 % | DIASTOLIC BLOOD PRESSURE: 89 MMHG | HEART RATE: 79 BPM

## 2019-01-17 DIAGNOSIS — M17.11 PRIMARY OSTEOARTHRITIS OF RIGHT KNEE: Primary | ICD-10-CM

## 2019-01-17 PROCEDURE — 99213 OFFICE O/P EST LOW 20 MIN: CPT | Performed by: FAMILY MEDICINE

## 2019-01-17 ASSESSMENT — PAIN SCALES - GENERAL: PAINLEVEL: MODERATE PAIN (5)

## 2019-01-17 NOTE — LETTER
1/17/2019         RE: Param Reilly  [address] [place] [address]        Dear Colleague,    Thank you for referring your patient, Param Reilly, to the [facility]. Please see a copy of my visit note below.    HISTORY OF PRESENT ILLNESS  Mr. Reilly is a pleasant 58 year old year old male following up with right knee osteoarthritis.  I performed a hyaluronic acid injection in Kevin's right knee on December 27.  His pain is much better today, although he has increased swelling inside his knee.  This hinders his range of motion and is generally annoying.  No pain today, no new issues.  Additional history: as documented      REVIEW OF SYSTEMS (1/17/2019)  10 point ROS of systems including Constitutional, Eyes, Respiratory, Cardiovascular, Gastroenterology, Genitourinary, Integumentary, Musculoskeletal, Psychiatric were all negative except for pertinent positives noted in my HPI.     PHYSICAL EXAM  Vitals:    01/17/19 1556   BP: 130/89   Pulse: 79   SpO2: 98%     Kevin's right knee does exhibit a 1+ effusion with a positive ballottement test.  He has no pain with range of motion, there is some discomfort at 120 degrees flexion.  He has no pain with palpation of the medial or lateral joint lines.  His overlying skin is normal, free of erythema, rashes, redness, or warmth.      ASSESSMENT & PLAN  Mr. Reilly is a 58 year old year old male following up with right knee osteoarthritis.    Kevin and I discussed his knee effusion for some time.  He does know that an aspiration may alleviate his symptoms, although ideally I would like to wait at least a couple of weeks due to his recent hyaluronic acid injection.    Kevin is going to return in about 2 weeks for an aspiration and possible injection with corticosteroid.    It was a pleasure seeing Kevin.        Ej Souza DO, CAKYMBERLY            Again, thank you for allowing me to participate in the care of your patient.         Sincerely,        Ej Souza, DO

## 2019-01-17 NOTE — PATIENT INSTRUCTIONS
Thanks for coming today.  Ortho/Sports Medicine Clinic  [address] [place] [address]    To schedule future appointments in Ortho Clinic, you may call [phone].    To schedule ordered imaging by your Provider: Call [facility] at [phone]    THYME available online at:   [email or web address]    Please call if any further questions or concerns [phone] and ask for the Orthopedic Department. Clinic hours 8 am to 5 pm.    Return to clinic if symptoms worsen.

## 2019-01-17 NOTE — PROGRESS NOTES
HISTORY OF PRESENT ILLNESS  Mr. Reilly is a pleasant 58 year old year old male following up with right knee osteoarthritis.  I performed a hyaluronic acid injection in Kevin's right knee on December 27.  His pain is much better today, although he has increased swelling inside his knee.  This hinders his range of motion and is generally annoying.  No pain today, no new issues.  Additional history: as documented      REVIEW OF SYSTEMS (1/17/2019)  10 point ROS of systems including Constitutional, Eyes, Respiratory, Cardiovascular, Gastroenterology, Genitourinary, Integumentary, Musculoskeletal, Psychiatric were all negative except for pertinent positives noted in my HPI.     PHYSICAL EXAM  Vitals:    01/17/19 1556   BP: 130/89   Pulse: 79   SpO2: 98%     Kevin's right knee does exhibit a 1+ effusion with a positive ballottement test.  He has no pain with range of motion, there is some discomfort at 120 degrees flexion.  He has no pain with palpation of the medial or lateral joint lines.  His overlying skin is normal, free of erythema, rashes, redness, or warmth.      ASSESSMENT & PLAN  Mr. Reilly is a 58 year old year old male following up with right knee osteoarthritis.    Kevin and I discussed his knee effusion for some time.  He does know that an aspiration may alleviate his symptoms, although ideally I would like to wait at least a couple of weeks due to his recent hyaluronic acid injection.    Kevin is going to return in about 2 weeks for an aspiration and possible injection with corticosteroid.    It was a pleasure seeing Kevin.        Ej Souza DO, CAM

## 2019-01-31 ENCOUNTER — OFFICE VISIT (OUTPATIENT)
Dept: ORTHOPEDICS | Facility: CLINIC | Age: 59
End: 2019-01-31
Payer: COMMERCIAL

## 2019-01-31 VITALS — DIASTOLIC BLOOD PRESSURE: 86 MMHG | HEART RATE: 90 BPM | SYSTOLIC BLOOD PRESSURE: 122 MMHG

## 2019-01-31 DIAGNOSIS — M25.461 EFFUSION OF RIGHT KNEE: Primary | ICD-10-CM

## 2019-01-31 PROCEDURE — 99207 ZZC DROP WITH A PROCEDURE: CPT | Performed by: FAMILY MEDICINE

## 2019-01-31 PROCEDURE — 20611 DRAIN/INJ JOINT/BURSA W/US: CPT | Mod: RT | Performed by: FAMILY MEDICINE

## 2019-01-31 NOTE — LETTER
1/31/2019         RE: Param Reilly  [address] [place] [address]        Dear Colleague,    Thank you for referring your patient, Param Reilly, to the [facility]. Please see a copy of my visit note below.    Kevin is here for a right knee aspiration and injection.    Vitals:    01/31/19 1626   BP: 122/86   Pulse: 90     Knee Aspiration and Injection - Ultrasound Guided  The patient was informed of the risks and the benefits of the procedure and a written consent was signed.  The patient s right knee was prepped with chlorhexidine in sterile fashion.   40 mg of triamcinolone suspension was drawn up into a 5 mL syringe with 2 mL of 1% lidocaine and 2 mL of 0.5% marcaine.  Procedure was performed using sterile technique.  Local anesthesia was performed using a 27-gauge 1.5-inch needle to administer 3 mL of 1% lidocaine without epi.  Under ultrasound guidance a 1.5-inch 18-gauge needle on a 30 cc syringe was used to enter the superolateral aspect of the right knee.  Needle placement was visualized and documented with ultrasound.  Ultrasound visualization was necessary due to ensure proper placement of needle in to the effusion, as well as resolution of effusion once aspiration was completed.  45 cc of clear yellow joint fluid was aspirated.  Syringe was then swapped for the 5 mL syringe containing the corticosteroid injectate, holding the needle in place with a sterile hemostat.  Injection was performed successfully, without difficulty.  Images were permanently stored for the patient's record.  There were no complications. The patient tolerated the procedure well. There was negligible bleeding.   The patient was instructed to ice the knee upon leaving clinic and refrain from overuse over the next 3 days.   The patient was instructed to call or go to the emergency room with any unusual pain, swelling, redness, or if otherwise concerned.  Kenalog: ND  [id number]      Brian Souza DO [facility]      Again, thank you for allowing me to participate in the care of your patient.        Sincerely,        Ej Souza DO

## 2019-01-31 NOTE — NURSING NOTE
Param Reilly's chief complaint for this visit includes:  Chief Complaint   Patient presents with     Right Knee - Edema     PCP: Noa Kaminski    Referring Provider:  No referring provider defined for this encounter.    /86   Pulse 90   Data Unavailable     Do you need any medication refills at today's visit? No.  Anette Faria RN

## 2019-02-01 NOTE — PROGRESS NOTES
Kevin is here for a right knee aspiration and injection.  He has right knee osteoarthritis.    Vitals:    01/31/19 1626   BP: 122/86   Pulse: 90     Knee Aspiration and Injection - Ultrasound Guided  The patient was informed of the risks and the benefits of the procedure and a written consent was signed.  The patient s right knee was prepped with chlorhexidine in sterile fashion.   40 mg of triamcinolone suspension was drawn up into a 5 mL syringe with 2 mL of 1% lidocaine and 2 mL of 0.5% marcaine.  Procedure was performed using sterile technique.  Local anesthesia was performed using a 27-gauge 1.5-inch needle to administer 3 mL of 1% lidocaine without epi.  Under ultrasound guidance a 1.5-inch 18-gauge needle on a 30 cc syringe was used to enter the superolateral aspect of the right knee.  Needle placement was visualized and documented with ultrasound.  Ultrasound visualization was necessary due to ensure proper placement of needle in to the effusion, as well as resolution of effusion once aspiration was completed.  45 cc of clear yellow joint fluid was aspirated.  Syringe was then swapped for the 5 mL syringe containing the corticosteroid injectate, holding the needle in place with a sterile hemostat.  Injection was performed successfully, without difficulty.  Images were permanently stored for the patient's record.  There were no complications. The patient tolerated the procedure well. There was negligible bleeding.   The patient was instructed to ice the knee upon leaving clinic and refrain from overuse over the next 3 days.   The patient was instructed to call or go to the emergency room with any unusual pain, swelling, redness, or if otherwise concerned.  Kenalog: NDC [id number]      Brian Souza DO CAQSM

## 2019-02-07 RX ORDER — TRIAMCINOLONE ACETONIDE 40 MG/ML
40 INJECTION, SUSPENSION INTRA-ARTICULAR; INTRAMUSCULAR ONCE
Status: ACTIVE | OUTPATIENT
Start: 2019-02-07

## 2019-09-30 ENCOUNTER — HEALTH MAINTENANCE LETTER (OUTPATIENT)
Age: 59
End: 2019-09-30

## 2021-01-15 ENCOUNTER — HEALTH MAINTENANCE LETTER (OUTPATIENT)
Age: 61
End: 2021-01-15

## 2021-08-31 NOTE — PROGRESS NOTES
"    Assessment & Plan     Bilateral impacted cerumen  Able to remove a little bit of wax from the right side and that looks pretty good and a large amount of wax from the left side but there is still wax present in the removal process was irritating the ear canal.  So rather than just Debrox eardrops I would like to prescribe Cortisporin to help heal the irritated canal.  Discussed using some soap and water to help wash this and setting up a recheck in a few days to a week.  We can perform some more removal at that time.  - neomycin-polymyxin-hydrocortisone (CORTISPORIN) [id number] otic solution; Place 3 drops in ear(s) 4 times daily For ear pain       Tobacco Cessation:   reports that he has been smoking cigarettes. He has been smoking about 1.00 pack per day. He has never used smokeless tobacco.  Tobacco Cessation Action Plan: Information offered: Patient not interested at this time    BMI:   Estimated body mass index is 28.79 kg/m  as calculated from the following:    Height as of this encounter: 1.708 m (5' 7.25\").    Weight as of this encounter: 84 kg (185 lb 3 oz).       See Patient Instructions    Return in about 1 week (around 9/8/2021) for Contact me with InvenQuery message.    Noa Kaminski MD  [facility]    Sheri Virgen is a 60 year old who presents for the following health issues     HPI     Acute Illness  Acute illness concerns: left ear plugged  Onset/Duration: 4-5 days  Symptoms:  Fever: no  Chills/Sweats: no  Headache (location?): no  Sinus Pressure: YES  Conjunctivitis:  no  Ear Pain: YES: left  Rhinorrhea: no  Congestion: no  Sore Throat: no  Cough: no  Wheeze: no  Decreased Appetite: no  Nausea: no  Vomiting: no  Diarrhea: no  Dysuria/Freq.: no  Dysuria or Hematuria: no  Fatigue/Achiness: no  Sick/Strep Exposure: no  Therapies tried and outcome: Ear Wax softener,   Here today for ear concerns as above.  Patient has had some cerumen impaction in the " "past and is fairly certain that is what is going on the left.  A little bit sore and hearing is very dull.  Right ear itches a little.    Review of Systems   Constitutional, HEENT, cardiovascular, pulmonary, gi and gu systems are negative, except as otherwise noted.      Objective    /82   Pulse 89   Temp 97.8  F (36.6  C)   Ht 1.708 m (5' 7.25\")   Wt 84 kg (185 lb 3 oz)   SpO2 96%   BMI 28.79 kg/m    Body mass index is 28.79 kg/m .  Physical Exam   Alert, pleasant, upbeat, and in no apparent discomfort.  Right ear canal 50% occluded which was easily removed and canal and tympanic membrane look normal.  Left ear canal completely occluded with a hard cerumen.  Quite a bit was removed manually and lavaged out but there is still a deep plug.  The canal became quite sore and irritated.  Past labs reviewed with the patient.                 "

## 2021-09-01 ENCOUNTER — OFFICE VISIT (OUTPATIENT)
Dept: FAMILY MEDICINE | Facility: CLINIC | Age: 61
End: 2021-09-01
Payer: COMMERCIAL

## 2021-09-01 VITALS
WEIGHT: 185.19 LBS | TEMPERATURE: 97.8 F | BODY MASS INDEX: 29.07 KG/M2 | OXYGEN SATURATION: 96 % | SYSTOLIC BLOOD PRESSURE: 122 MMHG | HEIGHT: 67 IN | HEART RATE: 89 BPM | DIASTOLIC BLOOD PRESSURE: 82 MMHG

## 2021-09-01 DIAGNOSIS — H61.23 BILATERAL IMPACTED CERUMEN: Primary | ICD-10-CM

## 2021-09-01 PROCEDURE — 99214 OFFICE O/P EST MOD 30 MIN: CPT | Performed by: FAMILY MEDICINE

## 2021-09-01 RX ORDER — NEOMYCIN SULFATE, POLYMYXIN B SULFATE, HYDROCORTISONE 3.5; 10000; 1 MG/ML; [USP'U]/ML; MG/ML
3 SOLUTION/ DROPS AURICULAR (OTIC) 4 TIMES DAILY
Qty: 10 ML | Refills: 0 | Status: SHIPPED | OUTPATIENT
Start: 2021-09-01

## 2021-09-01 ASSESSMENT — MIFFLIN-ST. JEOR: SCORE: 1612.59

## 2021-09-01 NOTE — NURSING NOTE
Patient identified using two patient identifiers.  Ear exam showing wax occlusion completed by provider.  Solution: warm water was placed in the both ear(s) via irrigation tool: elephant ear.    MA able to irrigate R ear completely, L ear not able to completely irrigate. Pt will come back later next week after using ear drops to have ears irrigated again by MA.

## 2021-09-08 ENCOUNTER — OFFICE VISIT (OUTPATIENT)
Dept: FAMILY MEDICINE | Facility: CLINIC | Age: 61
End: 2021-09-08
Payer: COMMERCIAL

## 2021-09-08 DIAGNOSIS — H61.22 IMPACTED CERUMEN OF LEFT EAR: Primary | ICD-10-CM

## 2021-09-08 PROCEDURE — 99207 PR NO CHARGE NURSE ONLY: CPT

## 2021-09-08 NOTE — NURSING NOTE
Patient identified using two patient identifiers.  Ear exam showing wax occlusion completed by provider at patients last visit on 09/01/2021.  Solution: warm water was placed in the left ear(s) via irrigation tool: elephant ear.    MA was able to irrigate L ear completely.

## 2021-09-08 NOTE — PROGRESS NOTES
Ear wash completed.       Answers for HPI/ROS submitted by the patient on 9/8/2021  How many servings of fruits and vegetables do you eat daily?: 2-3  On average, how many sweetened beverages do you drink each day (Examples: soda, juice, sweet tea, etc.  Do NOT count diet or artificially sweetened beverages)?: 0  How many minutes a day do you exercise enough to make your heart beat faster?: 60 or more  How many days a week do you exercise enough to make your heart beat faster?: 5  How many days per week do you miss taking your medication?: 0

## 2021-10-24 ENCOUNTER — HEALTH MAINTENANCE LETTER (OUTPATIENT)
Age: 61
End: 2021-10-24

## 2022-02-13 ENCOUNTER — HEALTH MAINTENANCE LETTER (OUTPATIENT)
Age: 62
End: 2022-02-13

## 2022-03-15 ENCOUNTER — OFFICE VISIT (OUTPATIENT)
Dept: URGENT CARE | Facility: URGENT CARE | Age: 62
End: 2022-03-15
Payer: COMMERCIAL

## 2022-03-15 ENCOUNTER — NURSE TRIAGE (OUTPATIENT)
Dept: NURSING | Facility: CLINIC | Age: 62
End: 2022-03-15
Payer: COMMERCIAL

## 2022-03-15 ENCOUNTER — TELEPHONE (OUTPATIENT)
Dept: FAMILY MEDICINE | Facility: CLINIC | Age: 62
End: 2022-03-15
Payer: COMMERCIAL

## 2022-03-15 ENCOUNTER — TELEPHONE (OUTPATIENT)
Dept: NURSING | Facility: CLINIC | Age: 62
End: 2022-03-15
Payer: COMMERCIAL

## 2022-03-15 VITALS
DIASTOLIC BLOOD PRESSURE: 89 MMHG | SYSTOLIC BLOOD PRESSURE: 138 MMHG | WEIGHT: 185.6 LBS | OXYGEN SATURATION: 97 % | BODY MASS INDEX: 28.85 KG/M2 | HEART RATE: 81 BPM | TEMPERATURE: 97.9 F

## 2022-03-15 DIAGNOSIS — R29.810 FACIAL DROOP: Primary | ICD-10-CM

## 2022-03-15 DIAGNOSIS — Z72.0 TOBACCO ABUSE: ICD-10-CM

## 2022-03-15 PROCEDURE — 99215 OFFICE O/P EST HI 40 MIN: CPT | Performed by: NURSE PRACTITIONER

## 2022-03-15 NOTE — TELEPHONE ENCOUNTER
Routing to PCP - Pt was traiged by FNA and requesting 2nd level triage.     Daksha Darden RN, BSN  [employer]

## 2022-03-15 NOTE — TELEPHONE ENCOUNTER
Reason for Call:  Other appointment    Detailed comments: needs a visit today per triage- for neurological concerns- patient did not want any other clinic    Phone Number Patient can be reached at: Cell number on file:    Telephone Information:   Mobile [phone]       Best Time: any    Can we leave a detailed message on this number? YES    Call taken on 3/15/2022 at 10:07 AM by Carie Donis

## 2022-03-15 NOTE — PROGRESS NOTES
Assessment & Plan     Facial droop    Tobacco abuse       With facial droop, pronator drift, neck stiffness, facial numbness recommend further evaluation in emergency room to rule out stroke. Patient agreeable and declines ambulance. He is discharged in stable condition.       Guillermina Boyer NP  [facility]    Sheri Virgen is a 61 year old male who presents to clinic today for the following health issues:  Chief Complaint   Patient presents with     Numbness     started over the weekend-- right side of the face is drooping and numb, ringing in ear on that side, eye is wattering      Neck Pain     headache, sniffness in neck      Patient presents for evaluation of facial drooping on the left side of his face for the past 3 days which has been stable. Associated symptoms: numbness on his left face, ringing in ear, headache, stiffness in neck, left eye watering. Denies confusion, surred speech, weakness, blurred vision, chest pain, shortness of breath. No history of stroke. He does currently use tobacco. He was advised to be seen in urgent care by PCP.     Problem list, Medication list, Allergies, and Medical history reviewed in EPIC.    ROS:  Review of systems negative except for noted above        Objective    /89   Pulse 81   Temp 97.9  F (36.6  C)   Wt 84.2 kg (185 lb 9.6 oz)   SpO2 97%   BMI 28.85 kg/m    Physical Exam  Constitutional:       General: He is not in acute distress.     Appearance: He is not toxic-appearing or diaphoretic.   HENT:      Head: Normocephalic.      Mouth/Throat:      Comments: Left side facial droop, smile asymmetric, tongue deviation to right  Eyes:      General:         Right eye: No discharge.         Left eye: No discharge.      Extraocular Movements: Extraocular movements intact.      Conjunctiva/sclera: Conjunctivae normal.      Pupils: Pupils are equal, round, and reactive to light.      Comments: Unable to wink left eye   Skin:      General: Skin is warm and dry.   Neurological:      Mental Status: He is alert and oriented to person, place, and time.      Cranial Nerves: Cranial nerve deficit present.      Sensory: No sensory deficit.      Motor: No weakness.      Coordination: Coordination normal.      Gait: Gait normal.      Comments: Right arm pronator drift

## 2022-03-15 NOTE — TELEPHONE ENCOUNTER
This writer called patient to relay Dr Kaminski's message. Patient will attempt to be seen in office first, but understands he needs to go to [facility] if appointment not available.    Lila Sauceda RN  [facility] Nurse Advisor  10:05 AM 3/15/2022

## 2022-03-15 NOTE — TELEPHONE ENCOUNTER
"Writer had a message from Tori that patient would like to speak with me.  Called patient back and he said \"he was on other line, trying to make appointment at a different clinic\".  Patient did not have a question for this writer presently.    Lila Sauceda RN  [facility] Nurse Advisor  10:33 AM 3/15/2022  "

## 2022-03-15 NOTE — TELEPHONE ENCOUNTER
There are 3 encounters open today for this patient.  Patient notified of Dr. Kaminski's message and agrees to plan.    Patient will go to the [facility] today.    Mellissa Huang RN, [facility]

## 2022-03-15 NOTE — TELEPHONE ENCOUNTER
Patient missed call and was returning call.  Did not have a Covid test done and was transferred to scheduling per this message.

## 2022-03-15 NOTE — TELEPHONE ENCOUNTER
"Patient calling with possible \"stroke symptoms\".  Writer established that there wasn't a neurological emergency, and preceded to obtain history from patient.  Patient stated that late Saturday, he noticed the left-side of this mouth wasn't \"working properly\".  Patient's smile is crooked, he is unable to whistle and his tongue feels \"funny\".  No weakness or numbness of extremities  No confusion or slurred speech  Patient had slight headache over the weekend.  Endorsed slight pain in neck, but able to touch his chin to chest without difficulty.  Writer will rout note high priority for advisement. Disposition is to go to office now    Lila Sauceda RN  [facility] Nurse Advisor  9:34 AM 3/15/2022      Reason for Disposition    Neurologic deficit of gradual onset, ANY of the following: * Weakness of the face, arm, or leg on one side of the body * Numbness of the face, arm, or leg on one side of the body * Loss of speech or garbled speech    Additional Information    Negative: Difficult to awaken or acting confused (e.g., disoriented, slurred speech)    Negative: Sounds like a life-threatening emergency to the triager    Negative: New neurologic deficit that is present NOW, sudden onset of ANY of the following: * Weakness of the face, arm, or leg on one side of the body* Numbness of the face, arm, or leg on one side of the body* Loss of speech or garbled speech    Negative: Confusion, disorientation, or hallucinations is the main symptom    Negative: Dizziness is the main symptom    Negative: Followed a head injury within last 3 days    Negative: Headache (with neurologic deficit)    Negative: Unable to urinate (or only a few drops) and bladder feels very full    Negative: Loss of control of bowel or bladder (i.e., incontinence) of new onset    Negative: Back pain with numbness (loss of sensation) in groin or rectal area    Negative: Patient sounds very sick or weak to the triager    Negative: Neurologic deficit " that was brief (now gone), ANY of the following: * Weakness of the face, arm, or leg on one side of the body * Numbness of the face, arm, or leg on one side of the body * Loss of speech or garbled speech    Protocols used: NEUROLOGIC DEFICIT-A-OH    COVID 19 Nurse Triage Plan/Patient Instructions    Please be aware that novel coronavirus (COVID-19) may be circulating in the community. If you develop symptoms such as fever, cough, or SOB or if you have concerns about the presence of another infection including coronavirus (COVID-19), please contact your health care provider or visit [email or web address].     Disposition/Instructions    In-Person Visit with provider recommended. Reference Visit Selection Guide.    Thank you for taking steps to prevent the spread of this virus.  o Limit your contact with others.  o Wear a simple mask to cover your cough.  o Wash your hands well and often.    Resources    [facility]: About COVID-19: [email or web address]    CDC: What to Do If You're Sick: www.cdc.gov/coronavirus/2019-ncov/about/steps-when-sick.html    CDC: Ending Home Isolation: www.cdc.gov/coronavirus/2019-ncov/hcp/disposition-in-home-patients.html     CDC: Caring for Someone: www.cdc.gov/coronavirus/2019-ncov/if-you-are-sick/care-for-someone.html     [facility]: Interim Guidance for Hospital Discharge to Home: www.health.[facility].[place]/diseases/coronavirus/hcp/hospdischarge.pdf    [facility] clinical trials (COVID-19 research studies): [email or web address].[facility]/umn-clinical-trials     Below are the COVID-19 hotlines at the [place] Department [facility] ([facility]). Interpreters are available.   o For health questions: Call 472-956-7847 or 1-441.109.4703 (7 a.m. to 7 p.m.)  o For questions about schools and childcare: Call 383-209-4245 or 1-318.169.2654 (7 a.m. to 7 p.m.)

## 2022-03-15 NOTE — TELEPHONE ENCOUNTER
Pt calling in had a msg from a Nurse named Lila, he's returning her call. I advised him at the moment unfortunately Lila is not available at the moment and that she will reach out to him at the # listed for pt ending in 1226, pt stated that's the # to contact him.

## 2022-03-15 NOTE — TELEPHONE ENCOUNTER
Spoke to patient.      Gave patient the message per Dr. Kaminski.  Patient agrees to plan.    Due to no available appointments  in the clinic with [facility] or [facility], the patient will go to [facility]  today.    Mellissa Huang RN, [facility]

## 2022-10-15 ENCOUNTER — HEALTH MAINTENANCE LETTER (OUTPATIENT)
Age: 62
End: 2022-10-15

## 2023-03-26 ENCOUNTER — HEALTH MAINTENANCE LETTER (OUTPATIENT)
Age: 63
End: 2023-03-26

## 2024-05-26 ENCOUNTER — HEALTH MAINTENANCE LETTER (OUTPATIENT)
Age: 64
End: 2024-05-26